# Patient Record
Sex: FEMALE | Race: WHITE | NOT HISPANIC OR LATINO | URBAN - METROPOLITAN AREA
[De-identification: names, ages, dates, MRNs, and addresses within clinical notes are randomized per-mention and may not be internally consistent; named-entity substitution may affect disease eponyms.]

---

## 2019-11-25 ENCOUNTER — AMBULATORY - RIVER FALLS (OUTPATIENT)
Dept: FAMILY MEDICINE | Facility: CLINIC | Age: 64
End: 2019-11-25

## 2019-11-27 ENCOUNTER — COMMUNICATION - RIVER FALLS (OUTPATIENT)
Dept: FAMILY MEDICINE | Facility: CLINIC | Age: 64
End: 2019-11-27

## 2019-12-09 ENCOUNTER — AMBULATORY - RIVER FALLS (OUTPATIENT)
Dept: FAMILY MEDICINE | Facility: CLINIC | Age: 64
End: 2019-12-09

## 2019-12-11 ENCOUNTER — COMMUNICATION - RIVER FALLS (OUTPATIENT)
Dept: FAMILY MEDICINE | Facility: CLINIC | Age: 64
End: 2019-12-11

## 2022-02-15 NOTE — NURSING NOTE
PPD Administration POC Entered On:  11/25/2019 3:13 PM CST    Performed On:  11/25/2019 3:13 PM CST by Mayra De León               PPD Administration   PPD Insertion Site :   Right forearm   PPD Amount Administered (mL) :   0.1 mL   Mayra De León - 11/25/2019 3:13 PM CST   Details   Collection Date :   11/25/2019 3:00 PM CST   Expiration Date :   5/22/2022 CDT   Lot#/Manufacture :   [id number]    :   [employer] Escoe RMA, Brandi - 11/25/2019 3:13 PM CST

## 2022-02-15 NOTE — NURSING NOTE
PPD Reading POC Entered On:  12/11/2019 2:19 PM CST    Performed On:  12/11/2019 2:18 PM CST by Yolis Ring CMA               PPD Reading   PPD mm of Induration :   0 mm   PPD Interpretation :   Negative   PPD Completion Status :   Completed   Yolis Ring CMA - 12/11/2019 2:18 PM CST

## 2023-10-04 NOTE — NURSING NOTE
PPD Administration POC Entered On:  12/9/2019 1:33 PM CST    Performed On:  12/9/2019 1:32 PM CST by Mayra De León               PPD Administration   PPD Insertion Site :   Right forearm   PPD Amount Administered (mL) :   0.1 mL   Mayra De León - 12/9/2019 1:32 PM CST   Details   Collection Date :   12/9/2019 1:15 PM CST   Expiration Date :   5/22/2022 CDT   Lot#/Manufacture :   [id number]    :   [employer] Escoe RMA, Brandi - 12/9/2019 1:32 PM CST  
Statement Selected